# Patient Record
Sex: FEMALE | Race: ASIAN | NOT HISPANIC OR LATINO | URBAN - METROPOLITAN AREA
[De-identification: names, ages, dates, MRNs, and addresses within clinical notes are randomized per-mention and may not be internally consistent; named-entity substitution may affect disease eponyms.]

---

## 2023-12-08 ENCOUNTER — ANCILLARY PROCEDURE (OUTPATIENT)
Dept: GENERAL RADIOLOGY | Facility: CLINIC | Age: 33
End: 2023-12-08
Attending: PHYSICIAN ASSISTANT
Payer: COMMERCIAL

## 2023-12-08 ENCOUNTER — OFFICE VISIT (OUTPATIENT)
Dept: URGENT CARE | Facility: URGENT CARE | Age: 33
End: 2023-12-08
Payer: COMMERCIAL

## 2023-12-08 VITALS
RESPIRATION RATE: 16 BRPM | WEIGHT: 107.5 LBS | DIASTOLIC BLOOD PRESSURE: 78 MMHG | OXYGEN SATURATION: 100 % | TEMPERATURE: 97.8 F | HEART RATE: 84 BPM | SYSTOLIC BLOOD PRESSURE: 112 MMHG

## 2023-12-08 DIAGNOSIS — R07.81 RIB PAIN: ICD-10-CM

## 2023-12-08 DIAGNOSIS — R07.81 RIB PAIN: Primary | ICD-10-CM

## 2023-12-08 PROCEDURE — 99203 OFFICE O/P NEW LOW 30 MIN: CPT | Performed by: PHYSICIAN ASSISTANT

## 2023-12-08 PROCEDURE — 71110 X-RAY EXAM RIBS BIL 3 VIEWS: CPT | Mod: TC | Performed by: RADIOLOGY

## 2023-12-08 RX ORDER — NAPROXEN 500 MG/1
500 TABLET ORAL 2 TIMES DAILY WITH MEALS
Qty: 20 TABLET | Refills: 1 | Status: SHIPPED | OUTPATIENT
Start: 2023-12-08

## 2023-12-08 RX ORDER — METHOCARBAMOL 500 MG/1
500 TABLET, FILM COATED ORAL 3 TIMES DAILY PRN
Qty: 30 TABLET | Refills: 0 | Status: SHIPPED | OUTPATIENT
Start: 2023-12-08 | End: 2023-12-18

## 2023-12-08 ASSESSMENT — PAIN SCALES - GENERAL: PAINLEVEL: MODERATE PAIN (5)
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
## 2024-12-30 ENCOUNTER — PATIENT OUTREACH (OUTPATIENT)
Dept: CARE COORDINATION | Facility: CLINIC | Age: 34
End: 2024-12-30
Payer: COMMERCIAL

## 2024-12-30 NOTE — PROGRESS NOTES
Food Resource Navigator Contact    FRN - Initial Outreach    Reason for call: Other: food insecurity    Food Insecurity: Not on file     Housing Stability: Not on file     Financial Resource Strain: Not on file     Transportation Needs: Not on file       The patient was provided with the following food resources: Emailed information to Jillian. Did not enroll her in [employer] today per her request. She will review.   [employer]  [facility] food resources     The patient was provided the following community resources:  None    I have discussed the following goals with the patient: Jillian to review the food resources sent via email and utilize them as needed to improve food access.     Spent 11 minutes in consult with the patient.     Bianca Harvey   [facility] Food Resource Navigator  [employer]   [phone]

## 2024-12-31 ENCOUNTER — OFFICE VISIT (OUTPATIENT)
Dept: FAMILY MEDICINE | Facility: CLINIC | Age: 34
End: 2024-12-31
Payer: COMMERCIAL

## 2024-12-31 VITALS
SYSTOLIC BLOOD PRESSURE: 104 MMHG | BODY MASS INDEX: 22.67 KG/M2 | DIASTOLIC BLOOD PRESSURE: 74 MMHG | TEMPERATURE: 98.1 F | HEART RATE: 66 BPM | RESPIRATION RATE: 16 BRPM | HEIGHT: 58 IN | WEIGHT: 108 LBS | OXYGEN SATURATION: 98 %

## 2024-12-31 DIAGNOSIS — L40.9 PSORIASIS: Primary | ICD-10-CM

## 2024-12-31 PROCEDURE — 99213 OFFICE O/P EST LOW 20 MIN: CPT | Performed by: PHYSICIAN ASSISTANT

## 2024-12-31 PROCEDURE — G2211 COMPLEX E/M VISIT ADD ON: HCPCS | Performed by: PHYSICIAN ASSISTANT

## 2024-12-31 RX ORDER — CLOBETASOL PROPIONATE 0.5 MG/ML
SOLUTION TOPICAL 2 TIMES DAILY
Qty: 50 ML | Refills: 3 | Status: SHIPPED | OUTPATIENT
Start: 2024-12-31 | End: 2025-01-14

## 2024-12-31 ASSESSMENT — PAIN SCALES - GENERAL: PAINLEVEL_OUTOF10: NO PAIN (0)

## 2024-12-31 NOTE — PATIENT INSTRUCTIONS
At [facility], we strive to deliver an exceptional experience to you, every time we see you. If you receive a survey, please let us know what we are doing well and/or what we could improve upon, as we do value your feedback.  If you have MyChart, you can expect to receive results automatically within 24 hours of their completion.  Your provider will send a note interpreting your results as well.   If you do not have MyChart, you should receive your results in about a week by mail.    Your care team:                            Family Medicine Internal Medicine   MD Hoang Gibson, MD oMjgan Conway, MD Ash Alegria, MD Allyn Huddleston, PATatyanaC    Matthew Moreno, MD Pediatrics   Hermila Abrams, MD Rukhsana Bermeo, MD Courtney Guy, APRN CNP Rose Ewing APRN CNP   MD Jackie Herron, MD Nadiya Au, CNP     Kiko Black, CNP Same-Day Provider (No follow-up visits)   JINA Barriga, DNP Jaqueline Cabello, JINA Fine, FNP, BC AURY EganC     Clinic hours: Monday - Thursday 7 am-6 pm; Fridays 7 am-5 pm.   Urgent care: Monday - Friday 10 am- 8 pm; Saturday and Sunday 9 am-5 pm.    Clinic: [phone]       [facility]: Monday - Thursday 8 am - 7 pm; Friday 8 am - 6 pm  [facility]: [phone]

## 2024-12-31 NOTE — PROGRESS NOTES
"  Assessment & Plan     Psoriasis  Discussed psoriasis, she has fairly localized patch to her scalp.  Not having other skin affected at this time.  Will treat with topical clobetasol for 2 weeks then as needed.  If not improving or developing spread, would recommend dermatology follow-up.  - clobetasol (TEMOVATE) 0.05 % external solution; Apply topically 2 times daily for 14 days.                Rody Walsh is a 34 year old, presenting for the following health issues:  Hair/Scalp Problem      12/31/2024    10:26 AM   Additional Questions   Roomed by Ange   Accompanied by self     Hair/Scalp Problem    History of Present Illness       Reason for visit:  Scalp Problems  Symptom onset:  More than a month  Symptoms include:  Itchy,flaky,red  Symptom intensity:  Mild  Symptom progression:  Worsening  Had these symptoms before:  No    She eats 0-1 servings of fruits and vegetables daily.She consumes 0 sweetened beverage(s) daily.She exercises with enough effort to increase her heart rate 9 or less minutes per day.  She exercises with enough effort to increase her heart rate 3 or less days per week.   She is taking medications regularly.     For several months she has had an itchy patch on her scalp.  It is dry, sometimes she will get larger flakes from the area.  She thought it could be psoriasis and tried an over-the-counter cream for this without much change.  She had 1 patch to her low back that was fairly mild, using over-the-counter cream and this resolved, has not returned.  No personal or family history of psoriasis.                Objective    /74 (BP Location: Left arm, Patient Position: Sitting, Cuff Size: Child)   Pulse 66   Temp 98.1  F (36.7  C) (Temporal)   Resp 16   Ht 1.485 m (4' 10.47\")   Wt 49 kg (108 lb)   LMP 12/28/2024   SpO2 98%   BMI 22.21 kg/m    Body mass index is 22.21 kg/m .  Physical Exam   GENERAL: alert and no distress  SKIN: Left occipital scalp with approximately 5 cm " diameter well-defined patch with silvery scale.  Remainder of scalp clear.  Mild dryness to left tragus, no other rashes seen.            Signed Electronically by: Melanie Colón PA-C